# Patient Record
Sex: FEMALE | Race: WHITE | URBAN - METROPOLITAN AREA
[De-identification: names, ages, dates, MRNs, and addresses within clinical notes are randomized per-mention and may not be internally consistent; named-entity substitution may affect disease eponyms.]

---

## 2017-02-04 ENCOUNTER — MYC MEDICAL ADVICE (OUTPATIENT)
Dept: RHEUMATOLOGY | Facility: CLINIC | Age: 37
End: 2017-02-04

## 2017-02-04 DIAGNOSIS — M32.9 SYSTEMIC LUPUS ERYTHEMATOSUS (H): Primary | ICD-10-CM

## 2017-02-04 RX ORDER — PREDNISONE 5 MG/1
TABLET ORAL
Qty: 50 TABLET | Refills: 1 | Status: SHIPPED
Start: 2017-02-04

## 2017-02-04 NOTE — TELEPHONE ENCOUNTER
Received page regarding patient with SLE flare and needing update on her usual prednisone burst and taper Rx as she has not needed it for over 1 year.     I called the patient and discussed. Sent the Rx to her pharmacy and all questions were answered.    MyChart message sent as well.    Anton Burleson

## 2017-02-06 NOTE — TELEPHONE ENCOUNTER
Anton, thanks for taking the call. Unfortunately pt is noncompliant with visits and labs. Vandana, could you please remind her to do lupus labs ASAP?

## 2017-02-06 NOTE — TELEPHONE ENCOUNTER
Called and spoke with pt, discussed that she has not had labs done, would like her to complete.  Let her know she can go to any [facility] lab, orders are waiting for her.  Discussed that I would also like to get her booked for an appt.  Offered Friday May 24th at 8:30, pt accepted.  Discussed last eye exam, which was 02/2015, pt aware she is due for another.    Vandana Dao RN  [facility]

## 2017-02-07 DIAGNOSIS — M32.9 SYSTEMIC LUPUS ERYTHEMATOSUS (H): ICD-10-CM

## 2017-02-07 LAB
ALBUMIN UR-MCNC: NEGATIVE MG/DL
APPEARANCE UR: CLEAR
BASOPHILS # BLD AUTO: 0 10E9/L (ref 0–0.2)
BASOPHILS NFR BLD AUTO: 0.1 %
BILIRUB UR QL STRIP: ABNORMAL
COLOR UR AUTO: YELLOW
CREAT UR-MCNC: 246 MG/DL
CRP SERPL-MCNC: <2.9 MG/L (ref 0–8)
DIFFERENTIAL METHOD BLD: ABNORMAL
EOSINOPHIL # BLD AUTO: 0 10E9/L (ref 0–0.7)
EOSINOPHIL NFR BLD AUTO: 0 %
ERYTHROCYTE [DISTWIDTH] IN BLOOD BY AUTOMATED COUNT: 12.4 % (ref 10–15)
ERYTHROCYTE [SEDIMENTATION RATE] IN BLOOD BY WESTERGREN METHOD: 5 MM/H (ref 0–20)
GLUCOSE UR STRIP-MCNC: NEGATIVE MG/DL
HCT VFR BLD AUTO: 41.8 % (ref 35–47)
HGB BLD-MCNC: 14.2 G/DL (ref 11.7–15.7)
HGB UR QL STRIP: NEGATIVE
KETONES UR STRIP-MCNC: ABNORMAL MG/DL
LEUKOCYTE ESTERASE UR QL STRIP: NEGATIVE
LYMPHOCYTES # BLD AUTO: 1.8 10E9/L (ref 0.8–5.3)
LYMPHOCYTES NFR BLD AUTO: 17.4 %
MCH RBC QN AUTO: 32.1 PG (ref 26.5–33)
MCHC RBC AUTO-ENTMCNC: 34 G/DL (ref 31.5–36.5)
MCV RBC AUTO: 95 FL (ref 78–100)
MONOCYTES # BLD AUTO: 0.3 10E9/L (ref 0–1.3)
MONOCYTES NFR BLD AUTO: 2.7 %
NEUTROPHILS # BLD AUTO: 8.1 10E9/L (ref 1.6–8.3)
NEUTROPHILS NFR BLD AUTO: 79.8 %
NITRATE UR QL: NEGATIVE
PH UR STRIP: 6 PH (ref 5–7)
PLATELET # BLD AUTO: 94 10E9/L (ref 150–450)
PROT UR-MCNC: 0.13 G/L
PROT/CREAT 24H UR: 0.05 G/G CR (ref 0–0.2)
RBC # BLD AUTO: 4.42 10E12/L (ref 3.8–5.2)
SP GR UR STRIP: >1.03 (ref 1–1.03)
URN SPEC COLLECT METH UR: ABNORMAL
UROBILINOGEN UR STRIP-ACNC: 1 EU/DL (ref 0.2–1)
WBC # BLD AUTO: 10.2 10E9/L (ref 4–11)

## 2017-02-07 PROCEDURE — 36415 COLL VENOUS BLD VENIPUNCTURE: CPT | Performed by: INTERNAL MEDICINE

## 2017-02-07 PROCEDURE — 86140 C-REACTIVE PROTEIN: CPT | Performed by: INTERNAL MEDICINE

## 2017-02-07 PROCEDURE — 84460 ALANINE AMINO (ALT) (SGPT): CPT | Performed by: INTERNAL MEDICINE

## 2017-02-07 PROCEDURE — 82565 ASSAY OF CREATININE: CPT | Performed by: INTERNAL MEDICINE

## 2017-02-07 PROCEDURE — 82040 ASSAY OF SERUM ALBUMIN: CPT | Performed by: INTERNAL MEDICINE

## 2017-02-07 PROCEDURE — 85652 RBC SED RATE AUTOMATED: CPT | Performed by: INTERNAL MEDICINE

## 2017-02-07 PROCEDURE — 84156 ASSAY OF PROTEIN URINE: CPT | Performed by: INTERNAL MEDICINE

## 2017-02-07 PROCEDURE — 86160 COMPLEMENT ANTIGEN: CPT | Performed by: INTERNAL MEDICINE

## 2017-02-07 PROCEDURE — 85025 COMPLETE CBC W/AUTO DIFF WBC: CPT | Performed by: INTERNAL MEDICINE

## 2017-02-07 PROCEDURE — 86225 DNA ANTIBODY NATIVE: CPT | Performed by: INTERNAL MEDICINE

## 2017-02-07 PROCEDURE — 84450 TRANSFERASE (AST) (SGOT): CPT | Performed by: INTERNAL MEDICINE

## 2017-02-07 PROCEDURE — 81003 URINALYSIS AUTO W/O SCOPE: CPT | Performed by: INTERNAL MEDICINE

## 2017-02-07 NOTE — Clinical Note
Patient:  Valerie Kim  :   1980  MRN:     [id number]        Ms.Angela DENILSON Kim  [address] [place] [id number]        2017    Dear ,    We are writing to inform you of your test results. Overall stable labs, just small drop in C3 which correlates with recent lupus flare.    Resulted Orders   Albumin level   Result Value Ref Range    Albumin 4.4 3.4 - 5.0 g/dL   ALT   Result Value Ref Range    ALT 18 0 - 50 U/L   AST   Result Value Ref Range    AST 7 0 - 45 U/L   CBC with platelets differential   Result Value Ref Range    WBC 10.2 4.0 - 11.0 10e9/L    RBC Count 4.42 3.8 - 5.2 10e12/L    Hemoglobin 14.2 11.7 - 15.7 g/dL    Hematocrit 41.8 35.0 - 47.0 %    MCV 95 78 - 100 fl    MCH 32.1 26.5 - 33.0 pg    MCHC 34.0 31.5 - 36.5 g/dL    RDW 12.4 10.0 - 15.0 %    Platelet Count 94 (L) 150 - 450 10e9/L    Diff Method Automated Method     % Neutrophils 79.8 %    % Lymphocytes 17.4 %    % Monocytes 2.7 %    % Eosinophils 0.0 %    % Basophils 0.1 %    Absolute Neutrophil 8.1 1.6 - 8.3 10e9/L    Absolute Lymphocytes 1.8 0.8 - 5.3 10e9/L    Absolute Monocytes 0.3 0.0 - 1.3 10e9/L    Absolute Eosinophils 0.0 0.0 - 0.7 10e9/L    Absolute Basophils 0.0 0.0 - 0.2 10e9/L   Creatinine   Result Value Ref Range    Creatinine 0.76 0.52 - 1.04 mg/dL    GFR Estimate 86 >60 mL/min/1.7m2      Comment:      Non  GFR Calc    GFR Estimate If Black >90   GFR Calc   >60 mL/min/1.7m2   Erythrocyte sedimentation rate auto   Result Value Ref Range    Sed Rate 5 0 - 20 mm/h   CRP inflammation   Result Value Ref Range    CRP Inflammation <2.9 0.0 - 8.0 mg/L   Creatinine random urine   Result Value Ref Range    Creatinine Urine Random 246 mg/dL   Protein  random urine (includes Creatinine Urine)   Result Value Ref Range    Protein Random Urine 0.13 g/L    Protein Total Urine g/gr Creatinine 0.05 0 - 0.2 g/g Cr   Complement C3   Result Value Ref Range    Complement C3 73 (L) 76  - 169 mg/dL   Complement C4   Result Value Ref Range    Complement C4 11 (L) 15 - 50 mg/dL   DNA double stranded antibodies   Result Value Ref Range    DNA-ds 3 <10 IU/mL      Comment:      Negative   *UA reflex to Microscopic and Culture ([facility] (except [place] and [place])   Result Value Ref Range    Color Urine Yellow     Appearance Urine Clear     Glucose Urine Negative NEG mg/dL    Bilirubin Urine (A) NEG     Small  This is an unconfirmed screening test result. A positive result may be false.      Ketones Urine Trace (A) NEG mg/dL    Specific Gravity Urine >1.030 1.003 - 1.035    Blood Urine Negative NEG    pH Urine 6.0 5.0 - 7.0 pH    Protein Albumin Urine Negative NEG mg/dL    Urobilinogen Urine 1.0 0.2 - 1.0 EU/dL    Nitrite Urine Negative NEG    Leukocyte Esterase Urine Negative NEG    Source Midstream Urine        Tricia Hernández MD

## 2017-02-08 LAB
ALBUMIN SERPL-MCNC: 4.4 G/DL (ref 3.4–5)
ALT SERPL W P-5'-P-CCNC: 18 U/L (ref 0–50)
AST SERPL W P-5'-P-CCNC: 7 U/L (ref 0–45)
C3 SERPL-MCNC: 73 MG/DL (ref 76–169)
C4 SERPL-MCNC: 11 MG/DL (ref 15–50)
CREAT SERPL-MCNC: 0.76 MG/DL (ref 0.52–1.04)
GFR SERPL CREATININE-BSD FRML MDRD: 86 ML/MIN/1.7M2

## 2017-02-09 LAB — DSDNA AB SER-ACNC: 3 IU/ML

## 2017-02-10 NOTE — PROGRESS NOTES
Quick Note:    Overall stable labs, just small drop in C3 which correlates with recent lupus flare.  ______

## 2017-07-01 ENCOUNTER — HEALTH MAINTENANCE LETTER (OUTPATIENT)
Age: 37
End: 2017-07-01

## 2019-01-07 ENCOUNTER — OFFICE VISIT (OUTPATIENT)
Dept: PODIATRY | Facility: CLINIC | Age: 39
End: 2019-01-07
Payer: COMMERCIAL

## 2019-01-07 VITALS
DIASTOLIC BLOOD PRESSURE: 68 MMHG | SYSTOLIC BLOOD PRESSURE: 110 MMHG | BODY MASS INDEX: 25.76 KG/M2 | WEIGHT: 140 LBS | HEIGHT: 62 IN

## 2019-01-07 DIAGNOSIS — L60.0 ONYCHOCRYPTOSIS: Primary | ICD-10-CM

## 2019-01-07 DIAGNOSIS — M79.672 BILATERAL FOOT PAIN: ICD-10-CM

## 2019-01-07 DIAGNOSIS — M79.671 BILATERAL FOOT PAIN: ICD-10-CM

## 2019-01-07 DIAGNOSIS — M21.621 TAILOR'S BUNIONETTE, RIGHT: ICD-10-CM

## 2019-01-07 PROCEDURE — 11719 TRIM NAIL(S) ANY NUMBER: CPT | Performed by: PODIATRIST

## 2019-01-07 PROCEDURE — 99203 OFFICE O/P NEW LOW 30 MIN: CPT | Mod: 25 | Performed by: PODIATRIST

## 2019-01-07 ASSESSMENT — MIFFLIN-ST. JEOR: SCORE: 1263.29

## 2019-01-07 NOTE — PATIENT INSTRUCTIONS
Thank you for choosing [facility]!    DR. VARGAS'S CLINIC LOCATIONS:   MONDAY - [place] TUESDAY - [place]   [address] [facility]  [address] [place] [address]   [place] [address] [place] [address]   [phone]       THURSDAY AM - [place] THURSDAY PM - [place]   [address]   [place] [address] [place], [place] [id number] [phone]       FRIDAY AM - [place] SET UP SURGERY: [phone] [address] APPOINTMENTS: [phone]   [place] [address] BILLING QUESTIONS: [phone] FAX NUMBER: [phone]     Follow Up:     PRICE THERAPY  Many aches and pains throughout the foot and ankle can be helped with many simple treatments. This is usually described as PRICE Therapy.      P - Protection - often times, inflammation/pain in the lower extremity is not able to improve simply because the areas involved are never allowed to rest. Every step we take can bother the problematic area. Protecting those areas is an important step in the healing process. This may involve a walking cast boot, a special insert/orthotic device, an ankle brace, or simply avoiding barefoot walking.    R - Rest - in addition to protecting the foot/ankle, resting is an important, but often times difficult, treatment option. Getting off your feet when they bother you, and specifically avoiding activities that cause pain/discomfort, are very beneficial to prevent, and treat, foot/ankle pain.      I - Ice - icing regularly can help to decrease inflammation and swelling in the foot, thus decreasing pain. Using an ice pack or a bag of frozen veggies works very well. Ice for 20 minutes multiple times per day as needed.  Do not place the ice directly on the skin as this can cause tissue damage.    C - Compression - using a compression wrap or an ACE wrap can help to decrease swelling, which can help to decrease pain. Wearing the wraps  is generally not needed at night, but they should be worn on a regular basis when you are going to be on your feet for prolonged periods as gravity tends to pull fluids down to your feet/ankles.    E - Elevation - elevating your lower extremities multiple times daily for 15-20 minutes can help to decrease swelling, which works well in decreasing pain levels.    NSAID/Tylenol - Anti-inflammatories like Aleve or ibuprofen, and/or a pain medication, such as Tylenol, can help to improve pain levels and get the issue resolved sooner rather than later. Anyone with liver issues should be careful with Tylenol, and anyone with high blood pressure or heart, stomach or kidney issues should be careful with anti-inflammatories. Please ask if you have questions about these medications, including dosage.    .OVER THE COUNTER INSERTS    Pond5 Fit BlueCava   Power Step   Walk-Fit Arch Cradles     Most of these can be found at your local [employer], [employer], or online:    1.  https://www.Phoneplus/en-us/  2.  Https://[employer]/  3.  Https://www.powersteps.com/    **A good high quality over the counter insert should cost around $40-$50              TAILOR'S BUNION    A tailor's bunion is a bump at the base of the fifth toe. The bump is a prominence in the bone. The overlying skin, nerves, and other soft tissues become irritated as your shoe rubs on the bump. The bump may periodically look larger depending on the degree of local inflammation. You may see redness in the area due to shoe pressure.   A sensory nerve lies directly over the bump. Numbness, burning pain, and tingling may develop due to pressure on the nerve. Long-term nerve irritation may lead to permanent nerve damage.    Most shoes are not shaped to accommodate a lump in this area of the foot. Shoes with stitched seams, rubber ridges and inflexible material will cause more irritation. Pointed dress shoes are  definitely problematic. Avoidance of poor fitting shoes is the first line of treatment.   Clinical examination and weight bearing x-rays are all it takes to diagnose this condition. Other treatment options include injection, professional stretching of the shoes, pads, and surgery.    Tailor's bunion surgery involves removing part of the bone in order to reduce the bump. Oftentimes, a bone cut or osteotomy is performed which allows the surgeon to realign your bone. Pins or screws will be used to stabilize the bone cut during the healing process.    Surgery is very effective but you will still need to be careful with shoe fit. A surgical incision is made in close proximity to the above mentioned Xavi. The scar may be sensitive for this reason. The fifth metatarsal bone is quite narrow and fragile. Surgical correction is somewhat limited by the local anatomy and shape of the bone. Pin or screw removal may be necessary at some point after the surgical site heals.       FYI: Body Mass Index (BMI)  Many things can cause foot and ankle problems. Foot structure, activity level, foot mechanics and injuries are common causes of pain. One very important issue that often goes unmentioned, is body weight. Extra weight can cause increased stress on muscles, ligaments, bones and tendons. Sometimes just a few extra pounds is all it takes to put one over her/his threshold. Without reducing that stress, it can be difficult to alleviate pain. Some people are uncomfortable addressing this issue, but we feel it is important for you to think about it. As Foot &  Ankle specialists, our job is addressing the lower extremity problem and possible causes. Regarding extra body weight, we encourage patients to discuss diet and weight management plans with their primary care doctors. It is this team approach that gives you the best opportunity for pain relief and getting you back on your feet.

## 2019-01-07 NOTE — LETTER
"    1/7/2019         RE: Valerie Kim  [address] [place] [id number]        Dear Colleague,    [facility]. Please see a copy of my visit note below.    Foot & Ankle Surgery  January 7, 2019    CC: \"sore toe(top of toe); lump on side of foot; swelling of feet\"    I was asked to see Valerie Kim regarding the chief complaint by:  self    HPI:  Pt is a 38 year old female who presents with above complaint.  Multiple bilateral issues x 1 1/2 years.  \"I got a pedicure 1 1/2 years ago\" and thinks she got an ingrown nail medial border L hallux.  It's not too sore today.  She also has pain associated with a prominent R 5th metatarsal head, \"feels like a bone to me\" sticking out of her foot.  Swelling, was in hands and feet, now mostly in feet.  History of autoimmune diseases, including lupus.  \"I'm going to see a rheumatologist\" soon.  \"are my arches too high?\", she states her  told her she has high arches, and she has pain across the dorsal foot as well.      ROS:   Pos for CC.  The patient denies current nausea, vomiting, chills, fevers, belly pain, calf pain, chest pain or SOB.  Complete remainder of ROS is otherwise neg.    VITALS:    Vitals:    01/07/19 1517   BP: 110/68   Weight: 63.5 kg (140 lb)   Height: 1.575 m (5' 2\")       PMH:    Past Medical History:   Diagnosis Date     Asthma     childhood asthma     Depression      ITP (idiopathic thrombocytopenic purpura)     dx at age 17       SXHX:  No past surgical history on file.     MEDS:    Current Outpatient Medications   Medication     buPROPion (WELLBUTRIN SR) 150 MG 12 hr tablet     Cholecalciferol (VITAMIN D) 2000 UNITS tablet     DULoxetine (CYMBALTA) 60 MG capsule     hydroxychloroquine (PLAQUENIL) 200 MG tablet     naproxen (NAPROSYN) 500 MG tablet     predniSONE (DELTASONE) 5 MG tablet     traMADol (ULTRAM) 50 MG tablet     vitamin D (ERGOCALCIFEROL) 15074 UNIT " capsule     No current facility-administered medications for this visit.        ALL:     Allergies   Allergen Reactions     Biaxin [Clarithromycin] Swelling     Throat swelling     Codeine Sulfate Swelling     ?throat swelling     Erythromycin Swelling     Throat swelling     Sulfa Drugs Hives     Penicillins Hives       FMH:    Family History   Problem Relation Age of Onset     Connective Tissue Disorder Unknown         Jeff's syndrome in cousin     Asthma Unknown      Diabetes Unknown      Connective Tissue Disorder Unknown         sarcoidosis in cousin and aunt, no FHx of SLE       SocHx:    Social History     Socioeconomic History     Marital status:      Spouse name: Not on file     Number of children: 2     Years of education: Not on file     Highest education level: Not on file   Social Needs     Financial resource strain: Not on file     Food insecurity - worry: Not on file     Food insecurity - inability: Not on file     Transportation needs - medical: Not on file     Transportation needs - non-medical: Not on file   Occupational History     Occupation: customer service   Tobacco Use     Smoking status: Current Every Day Smoker     Packs/day: 0.30     Years: 15.00     Pack years: 4.50     Smokeless tobacco: Never Used     Tobacco comment: quit before and will try again   Substance and Sexual Activity     Alcohol use: No     Drug use: No     Comment: +tattoo, no blood transfusion     Sexual activity: Not on file   Other Topics Concern     Not on file   Social History Narrative     Not on file           EXAMINATION:  Gen:   No apparent distress  Neuro:   A&Ox3, no deficits  Psych:    Answering questions appropriately for age and situation with normal affect  Head:    NCAT  Eye:    Visual scanning without deficit  Ear:    Response to auditory stimuli wnl  Lung:    Non-labored breathing on RA noted  Abd:    NTND per patient report  Lymph:    Neg for pitting/non-pitting edema BLE  Vasc:    Pulses  palpable, CFT minimally delayed  Neuro:    Light touch sensation intact to all sensory nerve distributions without paresthesias  Derm:    Onychocryptosis medial L hallux, tender with pressure, but no paronychia.  MSK:    Right lower extremity - tailor's bunion, prominent 5th metatarsal head.  Bilateral lower extremity - minimal edema or pain, but she points to the dorsal midfoot as area that can be painful.  Calf:    Neg for redness, swelling or tenderness    Assessment:  38 year old female with onychocryptosis medial L hallux; tailor's bunion right lower extremity; bilateral midfoot pain/swelling      Plan:  Discussed etiologies, anatomy and options  1.  Onychocryptosis medial L hallux  -slantback performed, leading edge  and removed with good initial pain relief.  Demonstrated for patient for home debridement  -if this does not provide sufficient long-term relief, advised P&A.    2.  Tailor's bunion right lower extremity   -comfortable accommodative shoe gear  -discussed padding options  -RICE/NSAID vs tylenol prn  -surgery is next step if above plan does not provide sufficient relief.  Briefly discussed procedure and post-op course    3.  Dorsal midfoot pain  -comfortable accommodative shoe christopher  -OTC insert for arch support  -RICE/NSAID vs tylenol prn based on pain levels.      Follow up:  prn or sooner with acute issues      Patient's medical history was reviewed today    Body mass index is 25.61 kg/m .  Weight management plan: Patient was referred to their PCP to discuss a diet and exercise plan.        Ebenezer Sheppard DPM FACFAS FACFAOM  Podiatric Foot & Ankle Surgeon  [facility]  [phone]      Again, thank you for allowing me to participate in the care of your patient.        Sincerely,        Ebenezer Sheppard DPM, JAMES

## 2019-01-07 NOTE — PROGRESS NOTES
"Foot & Ankle Surgery  January 7, 2019    CC: \"sore toe(top of toe); lump on side of foot; swelling of feet\"    I was asked to see Valerie Kim regarding the chief complaint by:  self    HPI:  Pt is a 38 year old female who presents with above complaint.  Multiple bilateral issues x 1 1/2 years.  \"I got a pedicure 1 1/2 years ago\" and thinks she got an ingrown nail medial border L hallux.  It's not too sore today.  She also has pain associated with a prominent R 5th metatarsal head, \"feels like a bone to me\" sticking out of her foot.  Swelling, was in hands and feet, now mostly in feet.  History of autoimmune diseases, including lupus.  \"I'm going to see a rheumatologist\" soon.  \"are my arches too high?\", she states her  told her she has high arches, and she has pain across the dorsal foot as well.      ROS:   Pos for CC.  The patient denies current nausea, vomiting, chills, fevers, belly pain, calf pain, chest pain or SOB.  Complete remainder of ROS is otherwise neg.    VITALS:    Vitals:    01/07/19 1517   BP: 110/68   Weight: 63.5 kg (140 lb)   Height: 1.575 m (5' 2\")       PMH:    Past Medical History:   Diagnosis Date     Asthma     childhood asthma     Depression      ITP (idiopathic thrombocytopenic purpura)     dx at age 17       SXHX:  No past surgical history on file.     MEDS:    Current Outpatient Medications   Medication     buPROPion (WELLBUTRIN SR) 150 MG 12 hr tablet     Cholecalciferol (VITAMIN D) 2000 UNITS tablet     DULoxetine (CYMBALTA) 60 MG capsule     hydroxychloroquine (PLAQUENIL) 200 MG tablet     naproxen (NAPROSYN) 500 MG tablet     predniSONE (DELTASONE) 5 MG tablet     traMADol (ULTRAM) 50 MG tablet     vitamin D (ERGOCALCIFEROL) 81244 UNIT capsule     No current facility-administered medications for this visit.        ALL:     Allergies   Allergen Reactions     Biaxin [Clarithromycin] Swelling     Throat swelling     Codeine Sulfate Swelling     ?throat swelling     " Erythromycin Swelling     Throat swelling     Sulfa Drugs Hives     Penicillins Hives       FMH:    Family History   Problem Relation Age of Onset     Connective Tissue Disorder Unknown         Jeff's syndrome in cousin     Asthma Unknown      Diabetes Unknown      Connective Tissue Disorder Unknown         sarcoidosis in cousin and aunt, no FHx of SLE       SocHx:    Social History     Socioeconomic History     Marital status:      Spouse name: Not on file     Number of children: 2     Years of education: Not on file     Highest education level: Not on file   Social Needs     Financial resource strain: Not on file     Food insecurity - worry: Not on file     Food insecurity - inability: Not on file     Transportation needs - medical: Not on file     Transportation needs - non-medical: Not on file   Occupational History     Occupation: customer service   Tobacco Use     Smoking status: Current Every Day Smoker     Packs/day: 0.30     Years: 15.00     Pack years: 4.50     Smokeless tobacco: Never Used     Tobacco comment: quit before and will try again   Substance and Sexual Activity     Alcohol use: No     Drug use: No     Comment: +tattoo, no blood transfusion     Sexual activity: Not on file   Other Topics Concern     Not on file   Social History Narrative     Not on file           EXAMINATION:  Gen:   No apparent distress  Neuro:   A&Ox3, no deficits  Psych:    Answering questions appropriately for age and situation with normal affect  Head:    NCAT  Eye:    Visual scanning without deficit  Ear:    Response to auditory stimuli wnl  Lung:    Non-labored breathing on RA noted  Abd:    NTND per patient report  Lymph:    Neg for pitting/non-pitting edema BLE  Vasc:    Pulses palpable, CFT minimally delayed  Neuro:    Light touch sensation intact to all sensory nerve distributions without paresthesias  Derm:    Onychocryptosis medial L hallux, tender with pressure, but no paronychia.  MSK:    Right lower  extremity - aura's bunion, prominent 5th metatarsal head.  Bilateral lower extremity - minimal edema or pain, but she points to the dorsal midfoot as area that can be painful.  Calf:    Neg for redness, swelling or tenderness    Assessment:  38 year old female with onychocryptosis medial L hallux; aura's bunion right lower extremity; bilateral midfoot pain/swelling      Plan:  Discussed etiologies, anatomy and options  1.  Onychocryptosis medial L hallux  -slantback performed, leading edge  and removed with good initial pain relief.  Demonstrated for patient for home debridement  -if this does not provide sufficient long-term relief, advised P&A.    2.  Tailor's bunion right lower extremity   -comfortable accommodative shoe gear  -discussed padding options  -RICE/NSAID vs tylenol prn  -surgery is next step if above plan does not provide sufficient relief.  Briefly discussed procedure and post-op course    3.  Dorsal midfoot pain  -comfortable accommodative shoe christopher  -OTC insert for arch support  -RICE/NSAID vs tylenol prn based on pain levels.      Follow up:  prn or sooner with acute issues      Patient's medical history was reviewed today    Body mass index is 25.61 kg/m .  Weight management plan: Patient was referred to their PCP to discuss a diet and exercise plan.        Ebenezer Sheppard DPM FACFAS FACFAOM  Podiatric Foot & Ankle Surgeon  [facility]  [phone]

## 2019-10-04 ENCOUNTER — HEALTH MAINTENANCE LETTER (OUTPATIENT)
Age: 39
End: 2019-10-04

## 2020-11-08 ENCOUNTER — HEALTH MAINTENANCE LETTER (OUTPATIENT)
Age: 40
End: 2020-11-08

## 2021-09-11 ENCOUNTER — HEALTH MAINTENANCE LETTER (OUTPATIENT)
Age: 41
End: 2021-09-11

## 2022-01-01 ENCOUNTER — HEALTH MAINTENANCE LETTER (OUTPATIENT)
Age: 42
End: 2022-01-01

## 2022-10-30 ENCOUNTER — HEALTH MAINTENANCE LETTER (OUTPATIENT)
Age: 42
End: 2022-10-30

## 2023-04-08 ENCOUNTER — HEALTH MAINTENANCE LETTER (OUTPATIENT)
Age: 43
End: 2023-04-08